# Patient Record
Sex: MALE
[De-identification: names, ages, dates, MRNs, and addresses within clinical notes are randomized per-mention and may not be internally consistent; named-entity substitution may affect disease eponyms.]

---

## 2023-10-05 ENCOUNTER — DASHBOARD ENCOUNTERS (OUTPATIENT)
Age: 28
End: 2023-10-05

## 2023-10-05 ENCOUNTER — OFFICE VISIT (OUTPATIENT)
Dept: URBAN - METROPOLITAN AREA CLINIC 82 | Facility: CLINIC | Age: 28
End: 2023-10-05
Payer: COMMERCIAL

## 2023-10-05 VITALS
TEMPERATURE: 97.9 F | SYSTOLIC BLOOD PRESSURE: 101 MMHG | WEIGHT: 170.8 LBS | DIASTOLIC BLOOD PRESSURE: 66 MMHG | HEIGHT: 67 IN | HEART RATE: 72 BPM | BODY MASS INDEX: 26.81 KG/M2

## 2023-10-05 DIAGNOSIS — R14.0 ABDOMINAL BLOATING: ICD-10-CM

## 2023-10-05 DIAGNOSIS — K30 INDIGESTION: ICD-10-CM

## 2023-10-05 PROBLEM — 162031009: Status: ACTIVE | Noted: 2023-10-05

## 2023-10-05 PROBLEM — 116289008: Status: ACTIVE | Noted: 2023-10-05

## 2023-10-05 PROCEDURE — 99203 OFFICE O/P NEW LOW 30 MIN: CPT | Performed by: INTERNAL MEDICINE

## 2023-10-05 NOTE — HPI-TODAY'S VISIT:
29 y/o  man from [facility] with no significant pmhx that came for eval given intermittent bloating and distention after eating wheat,bread,arepas,drinking beer.Refer also increase mucuse on stools,but no blood,black stools ,weight loss,N/V/D.No family hx of Celiac,IBD or colon cancer.His mother had IBS.No smoke.Drink socially

## 2023-10-10 ENCOUNTER — OFFICE VISIT (OUTPATIENT)
Dept: URBAN - METROPOLITAN AREA CLINIC 115 | Facility: CLINIC | Age: 28
End: 2023-10-10

## 2023-10-12 LAB
DEAMIDATED GLIADIN ABS, IGA: 4
IMMUNOGLOBULIN A, QN, SERUM: 216
T-TRANSGLUTAMINASE (TTG) IGA: <2